# Patient Record
Sex: MALE | Race: WHITE | URBAN - METROPOLITAN AREA
[De-identification: names, ages, dates, MRNs, and addresses within clinical notes are randomized per-mention and may not be internally consistent; named-entity substitution may affect disease eponyms.]

---

## 2021-07-31 ENCOUNTER — OFFICE VISIT (OUTPATIENT)
Dept: URGENT CARE | Facility: URGENT CARE | Age: 61
End: 2021-07-31
Payer: COMMERCIAL

## 2021-07-31 VITALS
TEMPERATURE: 97.6 F | RESPIRATION RATE: 16 BRPM | HEART RATE: 99 BPM | DIASTOLIC BLOOD PRESSURE: 78 MMHG | WEIGHT: 225.13 LBS | SYSTOLIC BLOOD PRESSURE: 112 MMHG | OXYGEN SATURATION: 97 %

## 2021-07-31 DIAGNOSIS — R06.02 SOB (SHORTNESS OF BREATH): ICD-10-CM

## 2021-07-31 DIAGNOSIS — R07.9 ACUTE CHEST PAIN: Primary | ICD-10-CM

## 2021-07-31 PROCEDURE — 99205 OFFICE O/P NEW HI 60 MIN: CPT | Performed by: PHYSICIAN ASSISTANT

## 2021-07-31 PROCEDURE — 93000 ELECTROCARDIOGRAM COMPLETE: CPT | Performed by: PHYSICIAN ASSISTANT

## 2021-07-31 RX ORDER — TESTOSTERONE CYPIONATE 1000 MG/10ML
INJECTION, SOLUTION INTRAMUSCULAR
COMMUNITY

## 2021-07-31 RX ORDER — NABUMETONE 500 MG/1
TABLET, FILM COATED ORAL
COMMUNITY
Start: 2020-11-23

## 2021-07-31 RX ORDER — ANASTROZOLE 1 MG/1
TABLET ORAL
COMMUNITY
Start: 2021-07-14

## 2021-07-31 RX ORDER — LISINOPRIL/HYDROCHLOROTHIAZIDE 10-12.5 MG
1 TABLET ORAL EVERY MORNING
COMMUNITY
Start: 2021-06-15

## 2021-07-31 RX ORDER — ROSUVASTATIN CALCIUM 10 MG/1
TABLET, COATED ORAL
COMMUNITY
Start: 2021-05-11

## 2021-07-31 RX ORDER — TESTOSTERONE CYPIONATE 200 MG/ML
INJECTION, SOLUTION INTRAMUSCULAR
COMMUNITY
Start: 2021-07-14

## 2021-07-31 NOTE — NURSING NOTE
The patient lives in [place], [place].  He left there this past Wednesday to come to the [facility].  The patient is fully vaccinated with the [place] COVID-19 vaccine.  His second dose was early May of 2021. The patient's last COVID test was May 8th. He travels a lot.  Lily Cedeño MA

## 2021-07-31 NOTE — PROGRESS NOTES
Differential Diagnosis:  Cardiac: Acute MI  Chest wall Pain  Pleurisy  Angina  GERD  PE  Palpitations  Pneumonia  Pericarditis  Panic/Anxiety    EKG-rate 95.  Sinus rhythm, within normal limits      ASSESSMENT:     ICD-10-CM    1. Acute chest pain  R07.9 EKG 12-lead complete w/read - Clinics   2. SOB (shortness of breath)  R06.02            PLAN: Cannot exclude cardiac etiology even though EKG is normal.  Cannot obtain troponin and get it back in a timely fashion.  Recommend ER for evaluation.  Declines ambulance, AGAINST MEDICAL ADVICE form signed.  While here he had another episode of chest pain that lasted a few seconds.  His friend will drive him directly to [facility] ER.  We are limited on labs and imaging here. I did call the ER to let them know he is coming.  I have discussed clinical findings with patient.  All questions are answered, patient indicates understanding of these issues and is in agreement with plan.   Patient care instructions are discussed/given at the end of visit.       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  Also 3 episodes of chest pain that lasted a few seconds.  No fever.  History of hypertension and hyperlipidemia, on meds for this.  Also takes testosterone injection and oral anastrozole for low testosterone.  Some nausea, no vomiting.  No increased sweating from his usual baseline.      Not on File    No past medical history on file.    No current outpatient medications on file prior to visit.  No current facility-administered medications on file prior to visit.      Social History     Tobacco Use     Smoking status: Not on file   Substance Use Topics     Alcohol use: Not on file       ROS:  CONSTITUTIONAL: Negative for fatigue or fever.  EYES: Negative for eye problems.  ENT: Neg for ST.  RESP: As above.  CV: as above.  GI: Negative for  vomiting.  MUSCULOSKELETAL:  Negative for significant muscle or joint pains.  NEUROLOGIC: Negative for headaches.  SKIN: Negative for rash.  PSYCH: Normal mentation for age.    OBJECTIVE:  /78 (BP Location: Left arm, Patient Position: Sitting, Cuff Size: Adult Regular)   Pulse 99   Temp 97.6  F (36.4  C) (Tympanic)   Resp 16   Wt 102.1 kg (225 lb 2 oz)   SpO2 97%      GENERAL APPEARANCE: Looks like he is not feeling well.  No sweating.  EYES:Conjunctiva/sclera clear.  RESP: Lungs clear to auscultation - no rales, rhonchi or wheezes  CV: Regular rate and rhythm, normal S1 S2, no murmur noted.  NEURO: Awake, alert    SKIN: No rashes  Gait- unsteady      Racquel Duncan PA-C

## 2021-07-31 NOTE — PATIENT INSTRUCTIONS
Shortness of breath that started this morning while on playing a baseball game.  3 episodes of chest pain, none now.  History of hypertension and hyperlipidemia.  EKG shows rate 95.  Sinus rhythm, within normal limits.To ER for evaluation